# Patient Record
Sex: FEMALE | Race: WHITE | Employment: OTHER | URBAN - METROPOLITAN AREA
[De-identification: names, ages, dates, MRNs, and addresses within clinical notes are randomized per-mention and may not be internally consistent; named-entity substitution may affect disease eponyms.]

---

## 2021-09-29 ENCOUNTER — HOSPITAL ENCOUNTER (EMERGENCY)
Age: 76
Discharge: HOME | End: 2021-09-29
Attending: EMERGENCY MEDICINE
Payer: MEDICARE

## 2021-09-29 VITALS
BODY MASS INDEX: 23.69 KG/M2 | HEART RATE: 108 BPM | RESPIRATION RATE: 16 BRPM | HEIGHT: 59 IN | SYSTOLIC BLOOD PRESSURE: 202 MMHG | WEIGHT: 117.5 LBS | OXYGEN SATURATION: 96 % | DIASTOLIC BLOOD PRESSURE: 107 MMHG | TEMPERATURE: 98.5 F

## 2021-09-29 DIAGNOSIS — B09 VIRAL EXANTHEM: Primary | ICD-10-CM

## 2021-09-29 DIAGNOSIS — R21 MALAR RASH: ICD-10-CM

## 2021-09-29 PROCEDURE — 99282 EMERGENCY DEPT VISIT SF MDM: CPT

## 2021-09-29 RX ORDER — PERMETHRIN 50 MG/G
CREAM TOPICAL
Qty: 60 G | Refills: 0 | Status: SHIPPED | OUTPATIENT
Start: 2021-09-29

## 2021-09-29 RX ORDER — PREDNISONE 20 MG/1
40 TABLET ORAL DAILY
Qty: 14 TABLET | Refills: 0 | Status: SHIPPED | OUTPATIENT
Start: 2021-09-29 | End: 2021-10-06

## 2021-09-29 RX ORDER — TRIAMCINOLONE ACETONIDE 1 MG/ML
LOTION TOPICAL
Qty: 60 ML | Refills: 0 | Status: SHIPPED | OUTPATIENT
Start: 2021-09-29

## 2021-09-29 NOTE — ED NOTES
Kesha Sotelo MD and Ruthy Dixon RN reviewed discharge instructions with the patient. The patient verbalized understanding. All questions and concerns were addressed. The patient is discharged via wheelchair in the care of family members with instructions and prescriptions in hand. Pt is alert and oriented x 4. Respirations are clear and unlabored.

## 2021-09-29 NOTE — DISCHARGE INSTRUCTIONS
The cause of your rash is not entirely clear. It may be a viral exanthem which can be due to mild viral infections as well as a reaction to the flu vaccine that you received. However, given the bright red appearance of your cheeks, this could be what is known as a malar rash or heliotrope rash, which can sometimes be a skin manifestation of other conditions such as lupus. For now we recommend that you take prednisone 40 mg a day for 1 week, and apply Kenalog cream to the most affected areas twice a day. You can also consider taking a course of permethrin to treat possible scabies, but try the steroids first.    Follow-up with your primary care physician and discuss conditions such as lupus, if your symptoms continue. It was a pleasure taking care of you at [facility] today. We know that when you come to [address] [place] [address], you are entrusting us with your health, comfort, and safety. Our physicians and nurses honor that trust, and we truly appreciate the opportunity to care for you and your loved ones. We also value your feedback. If you receive a survey about your Emergency Department experience today, please fill it out. We care about our patients' feedback, and we listen to what you have to say. Thank you!

## 2021-09-29 NOTE — ED PROVIDER NOTES
EMERGENCY DEPARTMENT HISTORY AND PHYSICAL EXAM      Date: 9/29/2021  Patient Name: Raleigh Buckley [facility]  Patient Age and Sex: 77 y.o. female  MRN:  [id number]  CSN:  [id number]    History of Presenting Illness     Chief Complaint   Patient presents with    Rash     pt ambulatory to triage and reports a rash all over for about ten days but says two days ago she began having a rash on her face. pt denies any new laundry detergents or changes in soaps. History Provided By: Patient    Ability to gather history was limited by:     HPI: Jose F Allen, 77 y.o. female with no significant past medical history, complains of approximately 10 days of generalized rash over the whole body. Moderate severity itching, especially on the backs of the legs and on the face. She reports that her trunk is less affected than the arms and legs. She thinks that it started down to her ankles and then spread over the rest of her body. No fevers. No body aches or muscle pain or any other pain. Of note she received an influenza vaccine about 10 days before the rash started. She denies any exposure to hot tubs, bodies of water or swimming, new detergents or chemicals etc.    Location:    Quality:      Severity:    Duration:   Timing:      Context:    Modifying factors:   Associated symptoms:     Past History      The patient's medical, surgical, and social history on file were reviewed by me today.  The family history was reviewed by me today and was non-contributory, unless otherwise specified below:    Past Medical History:  No past medical history on file. Past Surgical History:  No past surgical history on file. Family History:  No family history on file.     Social History:  Social History     Tobacco Use    Smoking status: Not on file   Substance Use Topics    Alcohol use: Not on file    Drug use: Not on file       Current Medications:  No current facility-administered medications on file prior to encounter. No current outpatient medications on file prior to encounter. Allergies:  No Known Allergies  Review of Systems    A complete ROS was reviewed by me today and was negative, unless otherwise specified below:    Review of Systems   Constitutional: Negative for diaphoresis, fatigue and fever. Respiratory: Negative for shortness of breath and wheezing. Cardiovascular: Negative for chest pain. Gastrointestinal: Negative for abdominal pain. Musculoskeletal: Negative for arthralgias, joint swelling and myalgias. Skin: Positive for rash. All other systems reviewed and are negative. Physical Exam   Vital Signs  Patient Vitals for the past 8 hrs:   Temp Pulse Resp BP SpO2   09/29/21 1144 98.5 °F (36.9 °C) (!) 108 16 (!) 202/107 96 %          Physical Exam  Vitals and nursing note reviewed. Constitutional:       General: She is not in acute distress. Appearance: Normal appearance. She is well-developed. She is not ill-appearing. HENT:      Head: Normocephalic and atraumatic. Mouth/Throat:      Mouth: Mucous membranes are moist.   Eyes:      General:         Right eye: No discharge. Left eye: No discharge. Conjunctiva/sclera: Conjunctivae normal.   Cardiovascular:      Rate and Rhythm: Normal rate and regular rhythm. Heart sounds: Normal heart sounds. No murmur heard. Pulmonary:      Effort: Pulmonary effort is normal. No respiratory distress. Breath sounds: Normal breath sounds. No wheezing. Abdominal:      General: There is no distension. Palpations: Abdomen is soft. Tenderness: There is no abdominal tenderness. Musculoskeletal:         General: No deformity. Normal range of motion. Cervical back: Normal range of motion and neck supple. Skin:     General: Skin is warm and dry. Findings: Rash present. Rash is papular. Rash is not vesicular.              Comments: Fine papular rash over the arms and legs, more confluent over the backs of the legs and the ventral forearms    No apparent vesicles. No signs of significant erythema or cellulitis. More confluent over the face and a malar rash distribution   Neurological:      General: No focal deficit present. Mental Status: She is alert and oriented to person, place, and time. Psychiatric:         Speech: Speech normal.         Behavior: Behavior normal.         Cognition and Memory: Cognition normal.             Diagnostic Study Results   Labs  No results found for this or any previous visit (from the past 24 hour(s)). Radiologic Studies  No orders to display     CT Results  (Last 48 hours)    None        CXR Results  (Last 48 hours)    None          Billable Procedures   Procedures    Medical Decision Making     I reviewed the patient's most recent Emergency Dept notes and diagnostic tests in formulating my MDM on today's visit. Provider Notes (Medical Decision Making):   78-year-old female with disseminated fine papular rash over the last 10 days, also some confluent itching redness over the bilateral face and a malar rash distribution. She has no body aches or pain, no arthralgias or fevers. Clinically very well-appearing. Hypertensive, afebrile. At this time would simply recommend a course of prednisone and triamcinolone cream to use as needed. I also prescribed permethrin but advised her to try the steroids first.  She does not have any known exposure to scabies. The rash does not appear to be disseminated zoster/shingles. No evidence of cellulitis. Could potentially represent a malar rash and new diagnosis of lupus, or heliotrope rash suggestive of dermatomyositis, but she has no other associated symptoms. Recommend follow-up with primary care in 1 to 2 weeks.     Latia Vogel MD  3:30 PM  9/29/2021     Consults:    Social History     Tobacco Use    Smoking status: Not on file   Substance Use Topics    Alcohol use: Not on file    Drug use: Not on file       Medications Administered during ED course:  Medications - No data to display       Prescriptions from today's ED visit:  Current Discharge Medication List      START taking these medications    Details   predniSONE (DELTASONE) 20 mg tablet Take 40 mg by mouth daily for 7 days. With Breakfast  Qty: 14 Tablet, Refills: 0  Start date: 9/29/2021, End date: 10/6/2021      triamcinolone (KENALOG) 0.1 % lotion Apply  to affected area three (3) times daily as needed for Skin Irritation or Itching. use thin layer  Qty: 60 mL, Refills: 0  Start date: 9/29/2021      permethrin (ACTICIN) 5 % topical cream Apply to whole body for 8-12 hours, then wash off. Repeat in one week. Qty: 60 g, Refills: 0  Start date: 9/29/2021            Diagnosis and Disposition     Disposition:  Discharged    Clinical Impression:   1. Viral exanthem    2. Malar rash        Attestation:  I personally performed the services described in this documentation on this date 9/29/2021 for patient Warren Cohn. Emely Garcia MD        I was the first provider for this patient on this visit. To the best of my ability I reviewed relevant prior medical records, electrocardiograms, laboratories, and radiologic studies. The patient's presenting problems were discussed, and the patient was in agreement with the care plan formulated and outlined with them. Emely Garcia MD    Please note that this dictation was completed with Dragon voice recognition software. Quite often unanticipated grammatical, syntax, homophones, and other interpretive errors are inadvertently transcribed by the computer software. Please disregard these errors and excuse any errors that have escaped final proofreading.

## 2023-05-17 RX ORDER — PERMETHRIN 50 MG/G
CREAM TOPICAL
COMMUNITY
Start: 2021-09-29

## 2023-05-17 RX ORDER — TRIAMCINOLONE ACETONIDE 1 MG/ML
LOTION TOPICAL 3 TIMES DAILY PRN
COMMUNITY
Start: 2021-09-29